# Patient Record
Sex: MALE | Race: WHITE | NOT HISPANIC OR LATINO | URBAN - METROPOLITAN AREA
[De-identification: names, ages, dates, MRNs, and addresses within clinical notes are randomized per-mention and may not be internally consistent; named-entity substitution may affect disease eponyms.]

---

## 2018-04-10 ENCOUNTER — EMERGENCY (EMERGENCY)
Facility: HOSPITAL | Age: 33
LOS: 0 days | Discharge: HOME | End: 2018-04-10
Attending: EMERGENCY MEDICINE

## 2018-04-10 VITALS
HEIGHT: 66 IN | SYSTOLIC BLOOD PRESSURE: 119 MMHG | RESPIRATION RATE: 18 BRPM | DIASTOLIC BLOOD PRESSURE: 80 MMHG | TEMPERATURE: 98 F | WEIGHT: 145.06 LBS | HEART RATE: 85 BPM | OXYGEN SATURATION: 100 %

## 2018-04-10 DIAGNOSIS — Z79.899 OTHER LONG TERM (CURRENT) DRUG THERAPY: ICD-10-CM

## 2018-04-10 DIAGNOSIS — F17.200 NICOTINE DEPENDENCE, UNSPECIFIED, UNCOMPLICATED: ICD-10-CM

## 2018-04-10 DIAGNOSIS — Z88.0 ALLERGY STATUS TO PENICILLIN: ICD-10-CM

## 2018-04-10 DIAGNOSIS — F11.10 OPIOID ABUSE, UNCOMPLICATED: ICD-10-CM

## 2018-04-10 NOTE — ED PROVIDER NOTE - OBJECTIVE STATEMENT
Patient is a 32 yo male with PM Hx of heroin abuse presents to the ER with requests for detox from heroin. Patient is a 32 yo male with PM Hx of heroin abuse presents to the ER with requests for detox from heroin. NO recent etoh use.  no suicidal ideation.  pt has no other complaints.  no cp, no sob, no abd pain, no back pain.  no headache.  Pt last used heroin today.  Friend found pt after using heroin.

## 2018-04-10 NOTE — ED PROVIDER NOTE - ATTENDING CONTRIBUTION TO CARE
34 yo m with pmh of polysubstance abuse presents requesting detox from heroin.  pt last used heroin today.  pt found by friend after using heroin.  no narcan was given.  No medications given.  pt brought in by friend for detox.  no recent etoh use.  pt requests detox from heroin only.  pt has no other complaints.  pt denies suicidal ideation.    awake, alert.  neck supple.  Lungs clear.  MMM.  pt moving all 4 ext.  motor/sensation intact in all 4 ext.  no tremors.  pt breathing comfortably.  a/p:  opiod abuse.  pt is nontoxic, well appearing.  pt did NOT get any narcan today.  pt dc with outpatient detox follow up.  pt is going now across the street to detox for opitod detox.

## 2018-04-10 NOTE — ED PROVIDER NOTE - PHYSICAL EXAMINATION
CONST: Well appearing in NAD  EYES: PERRL, EOMI, Sclera and conjunctiva clear  ENT: No nasal discharge. TM's clear B/L without drainage. Oropharynx normal appearing, no erythema or exudates. Uvula midline.  NECK: Non-tender, no meningeal signs  CARD: Normal S1 S2; Normal rate and rhythm  RESP: Equal BS B/L, No wheezes, rhonchi or rales. No distress  GI: Soft, non-tender, non-distended.  MS: Normal ROM in all extremities. No midline spinal tenderness.  SKIN: Warm, dry, no acute rashes. Good turgor  NEURO: A&Ox3, No focal deficits. Strength 5/5 with no sensory deficits. Steady gait

## 2018-04-10 NOTE — ED PROVIDER NOTE - PROGRESS NOTE DETAILS
Patient to be d/c'd and sent across the street for outpatient detox mgmt. Patient comfortable with d/c.

## 2018-04-10 NOTE — ED PROVIDER NOTE - MEDICAL DECISION MAKING DETAILS
Pt was here requesting detox from opiates.  pt dc with outpatient detox follow up.  pt has no other complaints.  pt was going to go directly to outpatient detox for detox from opiates.  pt denied any suicidal ideation.

## 2018-04-10 NOTE — ED PROVIDER NOTE - NS ED ROS FT
CONST: No fever  EYES: No pain, redness, drainage or visual changes.  ENT: No ear pain or discharge, nasal discharge or congestion. No sore throat  CARD: No chest pain, palpitations  RESP: No SOB, cough, hemoptysis  GI: No abdominal pain, N/V/D  MS: No joint pain, back pain or extremity pain/injury  SKIN: No rashes  NEURO: No headache, dizziness, paresthesias or LOC

## 2018-05-06 ENCOUNTER — INPATIENT (INPATIENT)
Facility: HOSPITAL | Age: 33
LOS: 4 days | Discharge: NURSING, REHAB OR LONG-TERM CARE | End: 2018-05-11
Attending: INTERNAL MEDICINE | Admitting: INTERNAL MEDICINE

## 2018-05-06 VITALS
RESPIRATION RATE: 16 BRPM | OXYGEN SATURATION: 100 % | TEMPERATURE: 98 F | HEART RATE: 72 BPM | HEIGHT: 67 IN | WEIGHT: 139.99 LBS | SYSTOLIC BLOOD PRESSURE: 122 MMHG | DIASTOLIC BLOOD PRESSURE: 62 MMHG

## 2018-05-06 LAB
ALBUMIN SERPL ELPH-MCNC: 4.1 G/DL — SIGNIFICANT CHANGE UP (ref 3.5–5.2)
ALP SERPL-CCNC: 127 U/L — HIGH (ref 30–115)
ALT FLD-CCNC: 21 U/L — SIGNIFICANT CHANGE UP (ref 0–41)
ANION GAP SERPL CALC-SCNC: 10 MMOL/L — SIGNIFICANT CHANGE UP (ref 7–14)
APAP SERPL-MCNC: <5 UG/ML — LOW (ref 10–30)
AST SERPL-CCNC: 52 U/L — HIGH (ref 0–41)
BASE EXCESS BLDV CALC-SCNC: 4.6 MMOL/L — HIGH (ref -2–2)
BILIRUB SERPL-MCNC: 0.5 MG/DL — SIGNIFICANT CHANGE UP (ref 0.2–1.2)
BUN SERPL-MCNC: 9 MG/DL — LOW (ref 10–20)
CALCIUM SERPL-MCNC: 8.5 MG/DL — SIGNIFICANT CHANGE UP (ref 8.5–10.1)
CHLORIDE SERPL-SCNC: 95 MMOL/L — LOW (ref 98–110)
CK SERPL-CCNC: 959 U/L — HIGH (ref 0–225)
CO2 SERPL-SCNC: 29 MMOL/L — SIGNIFICANT CHANGE UP (ref 17–32)
CREAT SERPL-MCNC: 1.2 MG/DL — SIGNIFICANT CHANGE UP (ref 0.7–1.5)
ETHANOL SERPL-MCNC: 355 MG/DL — HIGH
GLUCOSE SERPL-MCNC: 88 MG/DL — SIGNIFICANT CHANGE UP (ref 70–99)
HCO3 BLDV-SCNC: 32 MMOL/L — HIGH (ref 22–29)
HCT VFR BLD CALC: 45.2 % — SIGNIFICANT CHANGE UP (ref 42–52)
HGB BLD-MCNC: 14.9 G/DL — SIGNIFICANT CHANGE UP (ref 14–18)
HOROWITZ INDEX BLDV+IHG-RTO: 21 — SIGNIFICANT CHANGE UP
LACTATE BLDV-MCNC: 1.7 MMOL/L — HIGH (ref 0.5–1.6)
MCHC RBC-ENTMCNC: 27.9 PG — SIGNIFICANT CHANGE UP (ref 27–31)
MCHC RBC-ENTMCNC: 33 G/DL — SIGNIFICANT CHANGE UP (ref 32–37)
MCV RBC AUTO: 84.5 FL — SIGNIFICANT CHANGE UP (ref 80–94)
NRBC # BLD: 0 /100 WBCS — SIGNIFICANT CHANGE UP (ref 0–0)
PCO2 BLDV: 56 MMHG — HIGH (ref 41–51)
PH BLDV: 7.37 — SIGNIFICANT CHANGE UP (ref 7.26–7.43)
PLATELET # BLD AUTO: 265 K/UL — SIGNIFICANT CHANGE UP (ref 130–400)
PO2 BLDV: 31 MMHG — SIGNIFICANT CHANGE UP (ref 20–40)
POTASSIUM SERPL-MCNC: 5.3 MMOL/L — HIGH (ref 3.5–5)
POTASSIUM SERPL-SCNC: 5.3 MMOL/L — HIGH (ref 3.5–5)
PROT SERPL-MCNC: 7.2 G/DL — SIGNIFICANT CHANGE UP (ref 6–8)
RBC # BLD: 5.35 M/UL — SIGNIFICANT CHANGE UP (ref 4.7–6.1)
RBC # FLD: 13.9 % — SIGNIFICANT CHANGE UP (ref 11.5–14.5)
SALICYLATES SERPL-MCNC: <0.3 MG/DL — LOW (ref 4–30)
SAO2 % BLDV: 48 % — SIGNIFICANT CHANGE UP
SODIUM SERPL-SCNC: 134 MMOL/L — LOW (ref 135–146)
TROPONIN T SERPL-MCNC: <0.01 NG/ML — SIGNIFICANT CHANGE UP
WBC # BLD: 10.02 K/UL — SIGNIFICANT CHANGE UP (ref 4.8–10.8)
WBC # FLD AUTO: 10.02 K/UL — SIGNIFICANT CHANGE UP (ref 4.8–10.8)

## 2018-05-06 RX ORDER — NALOXONE HYDROCHLORIDE 4 MG/.1ML
0.4 SPRAY NASAL ONCE
Qty: 0 | Refills: 0 | Status: DISCONTINUED | OUTPATIENT
Start: 2018-05-06 | End: 2018-05-06

## 2018-05-06 RX ORDER — SODIUM CHLORIDE 9 MG/ML
1000 INJECTION INTRAMUSCULAR; INTRAVENOUS; SUBCUTANEOUS ONCE
Qty: 0 | Refills: 0 | Status: COMPLETED | OUTPATIENT
Start: 2018-05-06 | End: 2018-05-06

## 2018-05-06 RX ORDER — SODIUM BICARBONATE 1 MEQ/ML
50 SYRINGE (ML) INTRAVENOUS ONCE
Qty: 0 | Refills: 0 | Status: COMPLETED | OUTPATIENT
Start: 2018-05-06 | End: 2018-05-06

## 2018-05-06 RX ORDER — NALOXONE HYDROCHLORIDE 4 MG/.1ML
0.2 SPRAY NASAL ONCE
Qty: 0 | Refills: 0 | Status: COMPLETED | OUTPATIENT
Start: 2018-05-06 | End: 2018-05-06

## 2018-05-06 RX ORDER — MAGNESIUM SULFATE 500 MG/ML
2 VIAL (ML) INJECTION ONCE
Qty: 0 | Refills: 0 | Status: COMPLETED | OUTPATIENT
Start: 2018-05-06 | End: 2018-05-06

## 2018-05-06 RX ADMIN — Medication 50 MILLIEQUIVALENT(S): at 22:24

## 2018-05-06 RX ADMIN — Medication 50 GRAM(S): at 18:43

## 2018-05-06 RX ADMIN — SODIUM CHLORIDE 1000 MILLILITER(S): 9 INJECTION INTRAMUSCULAR; INTRAVENOUS; SUBCUTANEOUS at 22:04

## 2018-05-06 RX ADMIN — NALOXONE HYDROCHLORIDE 0.2 MILLIGRAM(S): 4 SPRAY NASAL at 17:50

## 2018-05-06 NOTE — ED PROVIDER NOTE - CARE PLAN
Principal Discharge DX:	Drug abuse Principal Discharge DX:	Alcohol abuse, continuous  Secondary Diagnosis:	Polysubstance abuse

## 2018-05-06 NOTE — ED ADULT TRIAGE NOTE - CHIEF COMPLAINT QUOTE
Pt brought in by brother as an overdose.  Per brother "He walked into my store like this.  He uses heroin, crack and any benzos he can get his hands on".

## 2018-05-06 NOTE — ED PROVIDER NOTE - PROGRESS NOTE DETAILS
VS remain stable, patient responded to 0.2mg on IV Narcan, pupils ar now nml size, he is more awake , follows directions.  Will keep 1:1 observation, case was d/c toxicology service. discussed with toxicology, recommends sodium bicarp amp to rule out sodium channel blockade.. will give amp and repeat EKG, if change in QRS will monitor , however, if no change, will be able to medically clear. amp of sodium barcab given, with no change in EKG Sleeping, VS WNL, will observe until clinically sober to obtain psych evaluation,  Case endorsed to Dr Arenas. VSS. sleeping. VSS. pt clinically sober. psych, dr. rivera at beside cleared from a psych perspective. recommend detox. pt declines detox and wishes to go home. counseled on risks and benefits. Counseled on red flags and to return for them. Counseled on importance of follow up. Patient repeats back instructions. Patient advised that they or their doctor may call [phone] to follow up on the specific results of the tests performed today in the emergency department.   Patient appears well on discharge. pt now wants detox. will admit for detox.

## 2018-05-06 NOTE — ED ADULT NURSE NOTE - PMH
Bipolar depression    Drug abuse    Drug abuse, IV    Methadone maintenance therapy patient Bipolar depression    Drug abuse    Drug abuse, IV    Endocarditis    Heart valve disease  tricuspid valve surgery  Methadone maintenance therapy patient

## 2018-05-06 NOTE — ED PROVIDER NOTE - MEDICAL DECISION MAKING DETAILS
I personally evaluated the patient. I reviewed the Resident’s or Physician Assistant’s note (as assigned above), and agree with the findings and plan except as documented in my note.  Chart reviewed. Seen by Dr. Roque (psych) and cleared for discharge. Dnies uicidal/homicidal ideation. Patient does not want detox. Will D/C home to follow up in outpatient detox. I personally evaluated the patient. I reviewed the Resident’s or Physician Assistant’s note (as assigned above), and agree with the findings and plan except as documented in my note.  Chart reviewed. Seen by Dr. Roque (psych) and cleared for discharge. Denies suicidal/homicidal ideation. Patient later agreed to detox. Will admit.

## 2018-05-06 NOTE — ED PROVIDER NOTE - PMH
Bipolar depression    Drug abuse    Drug abuse, IV    Endocarditis    Heart valve disease  tricuspid valve surgery  Methadone maintenance therapy patient

## 2018-05-06 NOTE — ED PROVIDER NOTE - PHYSICAL EXAMINATION
CONST: + pt drowsy, but arousable  EYES: +pinpoint pupuils, EOMI, Sclera and conjunctiva clear.   ENT: No nasal discharge. TM's clear B/L without drainage. Oropharynx normal appearing, no erythema or exudates. Uvula midline.  NECK: Non-tender, no meningeal signs  CARD: Normal S1 S2; Normal rate and rhythm  RESP: Equal BS B/L, No wheezes, rhonchi or rales. No distress  GI: Soft, non-tender, non-distended.  MS: Normal ROM in all extremities. No midline spinal tenderness.  SKIN: Warm, dry, no acute rashes. Good turgor  NEURO: A&Ox3, No focal deficits. Strength 5/5 with no sensory deficits.

## 2018-05-06 NOTE — ED PROVIDER NOTE - ATTENDING CONTRIBUTION TO CARE
32 yo male h/o bipolar disease and polysubstance abuse concurrently on Methadone program, lives in a shelter and unemployed brought by his brother for evaluation,  Brother is concerned that the patient is trying to kill himself, has been taking multiple drugs, drinking alcohol and saying  he wants to kill himself.  Has h/o prior detox, was clean for a bout a years, then relapsed and lost his job.  Patient arrived in ED intoxicated with signs of opioid overdose, clinical picture improved after administration of narcan,  Placed on 1:1 observation, labs were sent, will obtain psych consult when patient able to converse.  no external signs of trauma.

## 2018-05-06 NOTE — ED PROVIDER NOTE - OBJECTIVE STATEMENT
33 year old male with pmhx of ploysubstance use, brought in by brother for drug overdose. As per brother, unsure of what brother took, however using multiple drugs. brother admits pt walked into home and looked "high", so brought him in. as per family, pt has been having SI and talking about ways that he wants to hurt himself.

## 2018-05-07 DIAGNOSIS — F10.19 ALCOHOL ABUSE WITH UNSPECIFIED ALCOHOL-INDUCED DISORDER: ICD-10-CM

## 2018-05-07 DIAGNOSIS — F11.19 OPIOID ABUSE WITH UNSPECIFIED OPIOID-INDUCED DISORDER: ICD-10-CM

## 2018-05-07 DIAGNOSIS — F31.30 BIPOLAR DISORDER, CURRENT EPISODE DEPRESSED, MILD OR MODERATE SEVERITY, UNSPECIFIED: ICD-10-CM

## 2018-05-07 DIAGNOSIS — Z98.890 OTHER SPECIFIED POSTPROCEDURAL STATES: Chronic | ICD-10-CM

## 2018-05-07 DIAGNOSIS — F19.10 OTHER PSYCHOACTIVE SUBSTANCE ABUSE, UNCOMPLICATED: ICD-10-CM

## 2018-05-07 DIAGNOSIS — F11.129 OPIOID ABUSE WITH INTOXICATION, UNSPECIFIED: ICD-10-CM

## 2018-05-07 DIAGNOSIS — F11.20 OPIOID DEPENDENCE, UNCOMPLICATED: ICD-10-CM

## 2018-05-07 LAB
AMMONIA BLD-MCNC: 22 UMOL/L — SIGNIFICANT CHANGE UP (ref 11–55)
AMPHET UR-MCNC: NEGATIVE — SIGNIFICANT CHANGE UP
APPEARANCE UR: CLEAR — SIGNIFICANT CHANGE UP
BACTERIA # UR AUTO: (no result)
BARBITURATES UR SCN-MCNC: NEGATIVE — SIGNIFICANT CHANGE UP
BENZODIAZ UR-MCNC: POSITIVE
BILIRUB UR-MCNC: NEGATIVE — SIGNIFICANT CHANGE UP
COCAINE METAB.OTHER UR-MCNC: POSITIVE
COLOR SPEC: YELLOW — SIGNIFICANT CHANGE UP
DIFF PNL FLD: NEGATIVE — SIGNIFICANT CHANGE UP
DRUG SCREEN 1, URINE RESULT: SIGNIFICANT CHANGE UP
EPI CELLS # UR: (no result) /HPF
GLUCOSE UR QL: NEGATIVE MG/DL — SIGNIFICANT CHANGE UP
HAV IGM SER-ACNC: SIGNIFICANT CHANGE UP
HBV CORE IGM SER-ACNC: SIGNIFICANT CHANGE UP
HBV SURFACE AG SER-ACNC: SIGNIFICANT CHANGE UP
HCV AB S/CO SERPL IA: 14.38 S/CO — SIGNIFICANT CHANGE UP
HCV AB SERPL-IMP: REACTIVE
KETONES UR-MCNC: NEGATIVE — SIGNIFICANT CHANGE UP
LEUKOCYTE ESTERASE UR-ACNC: NEGATIVE — SIGNIFICANT CHANGE UP
MAGNESIUM SERPL-MCNC: 2.4 MG/DL — SIGNIFICANT CHANGE UP (ref 1.8–2.4)
METHADONE UR-MCNC: POSITIVE
NITRITE UR-MCNC: NEGATIVE — SIGNIFICANT CHANGE UP
OPIATES UR-MCNC: POSITIVE
PCP UR-MCNC: NEGATIVE — SIGNIFICANT CHANGE UP
PH UR: 7.5 — SIGNIFICANT CHANGE UP (ref 5–8)
PROPOXYPHENE QUALITATIVE URINE RESULT: NEGATIVE — SIGNIFICANT CHANGE UP
PROT UR-MCNC: (no result) MG/DL
SP GR SPEC: 1.02 — SIGNIFICANT CHANGE UP (ref 1.01–1.03)
T PALLIDUM AB TITR SER: NEGATIVE — SIGNIFICANT CHANGE UP
THC UR QL: NEGATIVE — SIGNIFICANT CHANGE UP
UROBILINOGEN FLD QL: 0.2 MG/DL — SIGNIFICANT CHANGE UP (ref 0.2–0.2)

## 2018-05-07 RX ORDER — MAGNESIUM HYDROXIDE 400 MG/1
30 TABLET, CHEWABLE ORAL DAILY
Qty: 0 | Refills: 0 | Status: DISCONTINUED | OUTPATIENT
Start: 2018-05-07 | End: 2018-05-11

## 2018-05-07 RX ORDER — HYDROXYZINE HCL 10 MG
100 TABLET ORAL AT BEDTIME
Qty: 0 | Refills: 0 | Status: DISCONTINUED | OUTPATIENT
Start: 2018-05-07 | End: 2018-05-11

## 2018-05-07 RX ORDER — PHENOBARBITAL 60 MG
32.4 TABLET ORAL EVERY 12 HOURS
Qty: 0 | Refills: 0 | Status: DISCONTINUED | OUTPATIENT
Start: 2018-05-10 | End: 2018-05-11

## 2018-05-07 RX ORDER — THIAMINE MONONITRATE (VIT B1) 100 MG
100 TABLET ORAL DAILY
Qty: 0 | Refills: 0 | Status: DISCONTINUED | OUTPATIENT
Start: 2018-05-07 | End: 2018-05-11

## 2018-05-07 RX ORDER — HYDROXYZINE HCL 10 MG
50 TABLET ORAL EVERY 6 HOURS
Qty: 0 | Refills: 0 | Status: DISCONTINUED | OUTPATIENT
Start: 2018-05-07 | End: 2018-05-11

## 2018-05-07 RX ORDER — PHENOBARBITAL 60 MG
32.4 TABLET ORAL EVERY 6 HOURS
Qty: 0 | Refills: 0 | Status: DISCONTINUED | OUTPATIENT
Start: 2018-05-09 | End: 2018-05-09

## 2018-05-07 RX ORDER — PHENOBARBITAL 60 MG
32.4 TABLET ORAL EVERY 4 HOURS
Qty: 0 | Refills: 0 | Status: DISCONTINUED | OUTPATIENT
Start: 2018-05-07 | End: 2018-05-11

## 2018-05-07 RX ORDER — PHENOBARBITAL 60 MG
32.4 TABLET ORAL EVERY 6 HOURS
Qty: 0 | Refills: 0 | Status: DISCONTINUED | OUTPATIENT
Start: 2018-05-07 | End: 2018-05-09

## 2018-05-07 RX ORDER — METHADONE HYDROCHLORIDE 40 MG/1
90 TABLET ORAL DAILY
Qty: 0 | Refills: 0 | Status: DISCONTINUED | OUTPATIENT
Start: 2018-05-07 | End: 2018-05-07

## 2018-05-07 RX ORDER — METHADONE HYDROCHLORIDE 40 MG/1
90 TABLET ORAL DAILY
Qty: 0 | Refills: 0 | Status: DISCONTINUED | OUTPATIENT
Start: 2018-05-07 | End: 2018-05-11

## 2018-05-07 RX ORDER — ESCITALOPRAM OXALATE 10 MG/1
20 TABLET, FILM COATED ORAL DAILY
Qty: 0 | Refills: 0 | Status: DISCONTINUED | OUTPATIENT
Start: 2018-05-07 | End: 2018-05-11

## 2018-05-07 RX ORDER — TUBERCULIN PURIFIED PROTEIN DERIVATIVE 5 [IU]/.1ML
5 INJECTION, SOLUTION INTRADERMAL ONCE
Qty: 0 | Refills: 0 | Status: COMPLETED | OUTPATIENT
Start: 2018-05-07 | End: 2018-05-07

## 2018-05-07 RX ORDER — FOLIC ACID 0.8 MG
1 TABLET ORAL DAILY
Qty: 0 | Refills: 0 | Status: DISCONTINUED | OUTPATIENT
Start: 2018-05-07 | End: 2018-05-11

## 2018-05-07 RX ORDER — PHENOBARBITAL 60 MG
TABLET ORAL
Qty: 0 | Refills: 0 | Status: COMPLETED | OUTPATIENT
Start: 2018-05-07 | End: 2018-05-11

## 2018-05-07 RX ADMIN — TUBERCULIN PURIFIED PROTEIN DERIVATIVE 5 UNIT(S): 5 INJECTION, SOLUTION INTRADERMAL at 11:22

## 2018-05-07 RX ADMIN — ESCITALOPRAM OXALATE 20 MILLIGRAM(S): 10 TABLET, FILM COATED ORAL at 09:14

## 2018-05-07 RX ADMIN — Medication 32.4 MILLIGRAM(S): at 17:38

## 2018-05-07 RX ADMIN — Medication 100 MILLIGRAM(S): at 09:13

## 2018-05-07 RX ADMIN — Medication 1 MILLIGRAM(S): at 09:13

## 2018-05-07 RX ADMIN — Medication 32.4 MILLIGRAM(S): at 12:44

## 2018-05-07 RX ADMIN — METHADONE HYDROCHLORIDE 90 MILLIGRAM(S): 40 TABLET ORAL at 09:51

## 2018-05-07 RX ADMIN — Medication 1 TABLET(S): at 09:13

## 2018-05-07 NOTE — ED BEHAVIORAL HEALTH ASSESSMENT NOTE - REASON FOR REFERRAL
Pt brought brother to ER due drug intoxication verbalizing suicidal ideation. Pt was brought by brother to ER due drug intoxication verbalizing suicidal ideation.

## 2018-05-07 NOTE — ED ADULT NURSE REASSESSMENT NOTE - NS ED NURSE REASSESS COMMENT FT1
Pt asleep at this time responsive to stimuli; A&Ox3. 1:1 observation in place. Father also at bedside.

## 2018-05-07 NOTE — ED BEHAVIORAL HEALTH ASSESSMENT NOTE - DETAILS
none available pt has 2 siblings with history of substance use. Case and psychiatric disposition discussed with ED MD Dr. Arenas

## 2018-05-07 NOTE — ED BEHAVIORAL HEALTH ASSESSMENT NOTE - HPI (INCLUDE ILLNESS QUALITY, SEVERITY, DURATION, TIMING, CONTEXT, MODIFYING FACTORS, ASSOCIATED SIGNS AND SYMPTOMS)
Pt is 32 y/o White male with long history of polysubstance use since 18 y/o consisting of alcohol, opioid and benzodiazepines with multiple detox and long term rehab at least more than three times the last time was 3 years ago. He currently has been attending [facility] in [place], [place] and it's Methadone program.  Pt was brought in by brother for possible drug overdose. Brother reported that possibly has using multiple drugs such as alcohol and heroin. Pt apparently left home and he came back, he looked "high."  Pt was reported to have verbalized thoughts of wanting to die before he left home so he was brought by brother  to ER and was subsequently referred for psychiatric consultation.

## 2018-05-07 NOTE — ED BEHAVIORAL HEALTH ASSESSMENT NOTE - DESCRIPTION
Pt was seen and evaluated by ER MD. He treated with Narcan for opioid intoxication. He was medically cleared and when he became sober, he referred for psychiatric consultation.  Pt was seen lying in bed, awake and sober.  Pt states that he does not remember saying the wanted to die and admits to have been drinking beer and vodka as well as used iv heroin prior to being brought to the ER.  Pt states that he does not remember who brought him to ER.  Pt currently alert, awake, oriented to all spheres.  He denies any severe depression, hopelessness or helplessness.  He reported that the psychiatrist at the [facility] has been prescribing him Remeron to help him sleep.  He denies any suicidal ideation, plan or intent to die at present.  He denies any psychiatric disorder, treatment or psychiatric hospitalization and denies any history of suicidal or homicidal risk behavior in the past and at present.  Pt does not pose any substantial risk of harm to self or others at this time and is psychiatrically cleared for discharge.  Pt was recommended to go for detox and rehab treatment. Pt denies any medical problem. pt is single never  finished High School currently unemployed and staying in a men's shelter.

## 2018-05-07 NOTE — H&P ADULT - ATTENDING COMMENTS
Patient interviewed and examined.    Chart reviewed.    PA's H&P noted and modified, as appropriate.    Case discussed on team rounds    Following is my summary of the case.    Admitted for detox: from __X__ED, ___Intake, ____Med/Surg Floor    Alcohol__X__   Opioid__X___  Benzo_X__ Other_____    Substance amount, duration of use, last usage, and prior attempts at detox or rehabs, are outlined above in the H&P and discussed with patient.    Associated withdrawal symptoms presents.  Comorbid conditions noted. Chronic and Stable.    Past Medical Hx, Psych Hx, family Hx, Social Hx from H&P reviewed and NO changes.    Old medical record and medication Hx. Reviewed    Following items reviewed and addressed:  1. labs  2. EKG  3. Imaging from PACs module    Examination: no change from PA's exam.    Place on following protocol  _____Medically Managed  __X__Medically Supervised    Ciwa_____Librium taper____Ativan taper___Methadone taper___ Phenobarb taper__X__ Suboxone Induction____MMTP_X___    Narcan Kit Offered    Psych Consult ___N/A  _X__Ordered  MDD    Physical Therapy  ___X N/A   ___  Ordered    Aftercare disposition to be addressed by counselors.    Estimated length of stay 3-5 days.

## 2018-05-07 NOTE — H&P ADULT - HISTORY OF PRESENT ILLNESS
· HPI Objective Statement: 33 year old male with pmhx of ploysubstance use, brought in by brother for drug overdose. As per brother, unsure of what brother took, however using multiple drugs. brother admits pt walked into home and looked "high", so brought him in. as per family, pt has been having SI and talking about ways that he wants to hurt himself., now awake alert  requesting detox  from alcohol vodka  1 pint daily  x  several weeks, xanax 4 mg  daily, , on mmtp [facility]  program 90 mgms  daily , last medicated   5/6/18  90 mgms	    HIV:    HIV Status:  · Offered: Declined	    PAST MEDICAL/SURGICAL/FAMILY/SOCIAL HISTORY:    Past Medical History:  Bipolar depression    Drug abuse    Drug abuse, IV    Endocarditis    Heart valve disease  tricuspid valve surgery  Methadone maintenance therapy patient.     Past Surgical History:  No significant past surgical history.     Tobacco Usage:  · Tobacco Usage	Unknown if ever smoked	    ALLERGIES AND HOME MEDICATIONS:   Allergies:        Allergies:  	penicillin: Drug, Other, unk    Home Medications:   * Incomplete Medication History as of 06-May-2018 18:22 documented in Structured Notes  · 	methadone: Last Dose Taken:  , 90 milligram(s) orally  · 	Lexapro: Last Dose Taken:  , orally once a day      PHYSICAL EXAM:   · Physical Examination: CONST: + pt drowsy, but arousable  EYES: +pinpoint pupuils, EOMI, Sclera and conjunctiva clear.   ENT: No nasal discharge. TM's clear B/L without drainage. Oropharynx normal appearing, no erythema or exudates. Uvula midline.  NECK: Non-tender, no meningeal signs  CARD: Normal S1 S2; Normal rate and rhythm  RESP: Equal BS B/L, No wheezes, rhonchi or rales. No distress  GI: Soft, non-tender, non-distended.  MS: Normal ROM in all extremities. No midline spinal tenderness.  SKIN: Warm, dry, no acute rashes. Good turgor NEURO: A&Ox3, No focal deficits. Strength 5/5 with no sensory deficits.

## 2018-05-07 NOTE — ED BEHAVIORAL HEALTH ASSESSMENT NOTE - OTHER
Brother and father brought pt to ER other shelter residents Pt used Iv heroin and alcohol opioid and alcohol intoxication Return to ER PRN

## 2018-05-07 NOTE — ED BEHAVIORAL HEALTH ASSESSMENT NOTE - DESCRIPTION (FIRST USE, LAST USE, QUANTITY, FREQUENCY, DURATION)
pt as been drinking on and off for the past 3 years a couple beers and vodka. pt admits to have been using IV heroin 4 to 5 bags per day for at least the past 3 years pt admits to occasional use of Xanax obtain from the streets

## 2018-05-07 NOTE — ED ADULT NURSE REASSESSMENT NOTE - NS ED NURSE REASSESS COMMENT FT1
Report given to [place] Mitchell, Detox. Pt AAOx3; suicide assessment completed; pt denies any depression, suicide, homicidal ideations. IV removed from LEFT A/C. Family ar bedside. Pt stable for transfer; awaiting transportation.

## 2018-05-07 NOTE — ED BEHAVIORAL HEALTH ASSESSMENT NOTE - OTHER PAST PSYCHIATRIC HISTORY (INCLUDE DETAILS REGARDING ONSET, COURSE OF ILLNESS, INPATIENT/OUTPATIENT TREATMENT)
Pt denies any past psychiatric disorder, treatment or hospitalization and admits to have been prescribed by psychiatrist  at [facility] with Remeron to help him fall asleep.

## 2018-05-07 NOTE — ED ADULT NURSE REASSESSMENT NOTE - NS ED NURSE REASSESS COMMENT FT1
Pt AAOx3; calm/cooperative; communicating w/ writer. Denies pain/discomfort. Pt no longer 1:1 as per MD Dorman.

## 2018-05-07 NOTE — H&P ADULT - NSHPLABSRESULTS_GEN_ALL_CORE
14.9   10.02 )-----------( 265      ( 06 May 2018 17:56 )             45.2   05-06    134<L>  |  95<L>  |  9<L>  ----------------------------<  88  5.3<H>   |  29  |  1.2    Ca    8.5      06 May 2018 17:56  Mg     2.4     05-07    TPro  7.2  /  Alb  4.1  /  TBili  0.5  /  DBili  x   /  AST  52<H>  /  ALT  21  /  AlkPhos  127<H>  05-06

## 2018-05-07 NOTE — ED BEHAVIORAL HEALTH ASSESSMENT NOTE - SUMMARY
Pt is a 32 y/o White male who presents to the ER with opioid and alcohol intoxication and was referred for psychiatric consultation due to reported verbalization of wanting to die.  History revealed that patient started using alcohol at age 18 y/o and progressed to IV heroin.  He had multiple detox and long term rehab at least 3 times, the last one was 3 years ago but relapsed and has been using alcohol , heroin and benzodiazepines within the past 3 years currently attending Methadone clinic. Pt has maladaptive use of substances for more than 12 months that has impaired his functioning with failure to perform his family  obligation and  responsibilities.  He is currently unemployed, unable to hold jobs due to his substance use and unable to stop using substances and spends most of his seeking and using substances.  Pt meets the criteria for diagnosis of Opioid Use Disorder, Alcohol Use Disorder and Anxiolytic, Hypnotic, Sedative Use Disorder. Pt does not pose any substantial risk of harm to self or others at this time and is psychiatrically cleared for discharge.  Pt was recommended to go for detox and rehab treatment.

## 2018-05-08 LAB
HCV RNA FLD QL NAA+PROBE: SIGNIFICANT CHANGE UP
HCV RNA SPEC QL PROBE+SIG AMP: SIGNIFICANT CHANGE UP

## 2018-05-08 RX ORDER — METHADONE HYDROCHLORIDE 40 MG/1
90 TABLET ORAL ONCE
Qty: 0 | Refills: 0 | Status: DISCONTINUED | OUTPATIENT
Start: 2018-05-08 | End: 2018-05-08

## 2018-05-08 RX ORDER — SALICYLIC ACID 0.5 %
1 CLEANSER (GRAM) TOPICAL
Qty: 0 | Refills: 0 | Status: DISCONTINUED | OUTPATIENT
Start: 2018-05-08 | End: 2018-05-11

## 2018-05-08 RX ADMIN — Medication 1 MILLIGRAM(S): at 09:27

## 2018-05-08 RX ADMIN — Medication 100 MILLIGRAM(S): at 09:27

## 2018-05-08 RX ADMIN — Medication 32.4 MILLIGRAM(S): at 11:56

## 2018-05-08 RX ADMIN — Medication 32.4 MILLIGRAM(S): at 00:29

## 2018-05-08 RX ADMIN — Medication 32.4 MILLIGRAM(S): at 17:30

## 2018-05-08 RX ADMIN — ESCITALOPRAM OXALATE 20 MILLIGRAM(S): 10 TABLET, FILM COATED ORAL at 09:27

## 2018-05-08 RX ADMIN — Medication 32.4 MILLIGRAM(S): at 06:01

## 2018-05-08 RX ADMIN — Medication 1 APPLICATION(S): at 09:27

## 2018-05-08 RX ADMIN — Medication 100 MILLIGRAM(S): at 00:29

## 2018-05-08 RX ADMIN — Medication 1 TABLET(S): at 09:27

## 2018-05-08 RX ADMIN — METHADONE HYDROCHLORIDE 90 MILLIGRAM(S): 40 TABLET ORAL at 09:28

## 2018-05-09 RX ORDER — DOCUSATE SODIUM 100 MG
200 CAPSULE ORAL
Qty: 0 | Refills: 0 | Status: DISCONTINUED | OUTPATIENT
Start: 2018-05-09 | End: 2018-05-11

## 2018-05-09 RX ADMIN — Medication 200 MILLIGRAM(S): at 20:28

## 2018-05-09 RX ADMIN — Medication 32.4 MILLIGRAM(S): at 06:10

## 2018-05-09 RX ADMIN — Medication 1 TABLET(S): at 08:45

## 2018-05-09 RX ADMIN — Medication 1 MILLIGRAM(S): at 08:45

## 2018-05-09 RX ADMIN — Medication 30 MILLILITER(S): at 13:57

## 2018-05-09 RX ADMIN — Medication 1 APPLICATION(S): at 08:45

## 2018-05-09 RX ADMIN — METHADONE HYDROCHLORIDE 90 MILLIGRAM(S): 40 TABLET ORAL at 06:10

## 2018-05-09 RX ADMIN — Medication 32.4 MILLIGRAM(S): at 17:20

## 2018-05-09 RX ADMIN — TUBERCULIN PURIFIED PROTEIN DERIVATIVE 5 UNIT(S): 5 INJECTION, SOLUTION INTRADERMAL at 14:42

## 2018-05-09 RX ADMIN — Medication 32.4 MILLIGRAM(S): at 00:48

## 2018-05-09 RX ADMIN — ESCITALOPRAM OXALATE 20 MILLIGRAM(S): 10 TABLET, FILM COATED ORAL at 08:45

## 2018-05-09 RX ADMIN — Medication 100 MILLIGRAM(S): at 08:45

## 2018-05-09 RX ADMIN — Medication 32.4 MILLIGRAM(S): at 11:18

## 2018-05-09 NOTE — BEHAVIORAL HEALTH ASSESSMENT NOTE - SUMMARY
34 yo SWM w ho polysubstance use, diagnosis of depression made while using substances. Has never had full trial of SSRI due to GI upset. Pt is tolerating meds this time around and agrees to remain compliant. Is motivated for recovery but ambivalent about inpt treatment.   Continue with Lexapro 20 mg.

## 2018-05-09 NOTE — BEHAVIORAL HEALTH ASSESSMENT NOTE - HPI (INCLUDE ILLNESS QUALITY, SEVERITY, DURATION, TIMING, CONTEXT, MODIFYING FACTORS, ASSOCIATED SIGNS AND SYMPTOMS)
34 yo SWM w ho sed-hyp use disorder, etoh, opiate use disorder on mmtp. Pt reports use of etoh starting age 15 and age 22 use of "pills" oxycontin, benzos. Pt reports first intervention age 24 or 25- did detox and 30 day rehab with an outpt program as follow up and maintained sobriety for 1 yr after that. Pt reports relapse attributed to "I still wanted to dabble". Pt reports approx 4 times in rehab/detox setting with longest abstinence the year mentioned above. Pt reports motivation for recovery attributed to "my family". Pt reports coming in to ER 2 days ago during which it was reported he may have had several seizures but pt has poor recollection of events of that day.   Pt has been diagnosed with MDD and anxiety in late 20s, denies past IPP, denies past suicide attempts, denies current SI. Pt reports past med trials of lexapro but has never been consistent as he reported the first few doses "made me queasy". Pt reports currently he is feeling optimistic about recovery but ambivalent about being hospitalized for it. During assessment, affect is neutral. Has been helped by methadone to reduce opiate use but pt reports recent binge drinking and increased benzo use.

## 2018-05-09 NOTE — BEHAVIORAL HEALTH ASSESSMENT NOTE - DETAILS
last seizure 2 days ago, believes this wasn't his first sz paternal uncle "bipolar schizophrenic" paternal grandfather w etoh use disorder

## 2018-05-10 RX ORDER — METHADONE HYDROCHLORIDE 40 MG/1
90 TABLET ORAL
Qty: 0 | Refills: 0 | COMMUNITY

## 2018-05-10 RX ADMIN — ESCITALOPRAM OXALATE 20 MILLIGRAM(S): 10 TABLET, FILM COATED ORAL at 08:52

## 2018-05-10 RX ADMIN — Medication 32.4 MILLIGRAM(S): at 06:11

## 2018-05-10 RX ADMIN — Medication 200 MILLIGRAM(S): at 08:52

## 2018-05-10 RX ADMIN — Medication 100 MILLIGRAM(S): at 00:33

## 2018-05-10 RX ADMIN — Medication 32.4 MILLIGRAM(S): at 17:36

## 2018-05-10 RX ADMIN — Medication 1 MILLIGRAM(S): at 08:52

## 2018-05-10 RX ADMIN — Medication 100 MILLIGRAM(S): at 08:52

## 2018-05-10 RX ADMIN — Medication 1 APPLICATION(S): at 08:52

## 2018-05-10 RX ADMIN — Medication 1 TABLET(S): at 08:52

## 2018-05-10 RX ADMIN — METHADONE HYDROCHLORIDE 90 MILLIGRAM(S): 40 TABLET ORAL at 06:11

## 2018-05-10 RX ADMIN — Medication 200 MILLIGRAM(S): at 21:07

## 2018-05-10 NOTE — CHART NOTE - NSCHARTNOTEFT_GEN_A_CORE
Subsequent Inpatient Encounter                                       Detox Unit    ARTHUR CASTILLO   33y   Male      Chief Complaint:    Follow up for Polysubstance  Dependency    HPI:     I reviewed previous notes. No Change, except if noted below.             Detail:_    ROS:   I reviewed with patient.  No changes from previous notes except if noted below.             Detail: _    PFSH I reviewed with patient. No changes from previous notes except if noted below.             Detail_    Medication reconciliation performed.    MEDICATIONS  (STANDING):  escitalopram 20 milliGRAM(s) Oral daily  folic acid 1 milliGRAM(s) Oral daily  methadone   Dispersible 90 milliGRAM(s) Oral daily  multivitamin 1 Tablet(s) Oral daily  PHENobarbital   Oral   PHENobarbital 32.4 milliGRAM(s) Oral every 6 hours  thiamine 100 milliGRAM(s) Oral daily      MEDICATIONS  (PRN):  aluminum hydroxide/magnesium hydroxide/simethicone Suspension 30 milliLiter(s) Oral every 4 hours PRN Dyspepsia  cloNIDine 0.1 milliGRAM(s) Oral every 8 hours PRN sbp>140  hydrOXYzine hydrochloride 50 milliGRAM(s) Oral every 6 hours PRN Anxiety  hydrOXYzine hydrochloride 100 milliGRAM(s) Oral at bedtime PRN insomnia  magnesium hydroxide Suspension 30 milliLiter(s) Oral daily PRN Constipation  PHENobarbital 32.4 milliGRAM(s) Oral every 4 hours PRN Withdrawal  vitamin A &amp; D Ointment 1 Application(s) Topical four times a day PRN dry skin      T(C): 35.7 (18 @ 06:00), Max: 36.9 (18 @ 18:00)  HR: 60 (18 @ 06:00) (53 - 65)  BP: 139/55 (18 @ 06:00) (118/69 - 139/55)  RR: 14 (18 @ 06:00) (14 - 16)  SpO2: --    PHYSICAL EXAM:      Constitutional: NAD, A&O x3    Eyes: PERRLA, no conjuctivitis    Neck: no lymphadenopathy    Respiratory: +air entry, no rales, no rhonchi, no wheezes    Cardiovascular: +S1 and S2, regular rate and rhythm    Gastrointestinal: +BS, soft, non-tender, not distended    Extremities:  no edema, no calf tenderness    Skin: no rashes, normal turgor            Magnesium, Serum: 2.4 mg/dL (18 @ 05:07)  Ammonia, Serum: 22 umol/L (18 @ 05:07)  Treponema Pallidum Antibody Interpretation: Negative (18 @ 05:07)  Hepatitis B Surface Antigen: Nonreact (18 @ 05:07)  Hepatitis C Virus S/CO Ratio: 14.38 S/CO (18 @ 05:07)    Hepatitis C Virus Interpretation: Reactive (18 @ 05:07)      Urinalysis Basic - ( 07 May 2018 10:55 )    Color: Yellow / Appearance: Clear / S.020 / pH: x  Gluc: x / Ketone: Negative  / Bili: Negative / Urobili: 0.2 mg/dL   Blood: x / Protein: Trace mg/dL / Nitrite: Negative   Leuk Esterase: Negative / RBC: x / WBC x   Sq Epi: x / Non Sq Epi: Few /HPF / Bacteria: Moderate    Drug Screen 1, Urine Result: Done (18 @ 10:55)        Impression and Plan:    Primary Diagnosis:  Benzo/Opiate Dependency                                Medication: Pheno/Methadone Protocol    Secondary Diagnosis:                                                                                Medication:    Tertiary Diagnosis:                                                                                     Medication:      Continue Detox Protocols. Use of PRNS as needed for withdrawal and comfort.    Adjustments to protocols:    Labs/ Tests reviewed.    Tests ordered:     Likely Disposition: _X__Home       ___Rehab       ___Outpatient Program    ___Self Help     _____Other    Estimated Length of stay:__4__

## 2018-05-10 NOTE — CHART NOTE - NSCHARTNOTEFT_GEN_A_CORE
Allergies:  penicillin (Other)      Diet: Regular    Activity: as tolerated    Follow up with    1. PMD in 2 weeks    2. Psych in 2 weeks      Extra Instructions:      Flu Vaccine given  Yes_____         No______      Diagnosis:  Chemical Dependency   Maintain sobriety  refrain from all use      Patient Signature___________________________________________  Date_________________      Nurse Signature_____________________________________________Date_________________

## 2018-05-11 ENCOUNTER — INPATIENT (INPATIENT)
Facility: HOSPITAL | Age: 33
LOS: 11 days | Discharge: HOME | End: 2018-05-23
Attending: INTERNAL MEDICINE | Admitting: INTERNAL MEDICINE

## 2018-05-11 VITALS
RESPIRATION RATE: 16 BRPM | TEMPERATURE: 98 F | HEART RATE: 92 BPM | DIASTOLIC BLOOD PRESSURE: 83 MMHG | SYSTOLIC BLOOD PRESSURE: 125 MMHG

## 2018-05-11 VITALS
RESPIRATION RATE: 16 BRPM | WEIGHT: 139.99 LBS | HEART RATE: 68 BPM | TEMPERATURE: 98 F | DIASTOLIC BLOOD PRESSURE: 59 MMHG | SYSTOLIC BLOOD PRESSURE: 116 MMHG | HEIGHT: 66 IN

## 2018-05-11 DIAGNOSIS — F10.20 ALCOHOL DEPENDENCE, UNCOMPLICATED: ICD-10-CM

## 2018-05-11 DIAGNOSIS — Z98.890 OTHER SPECIFIED POSTPROCEDURAL STATES: Chronic | ICD-10-CM

## 2018-05-11 PROBLEM — I38 ENDOCARDITIS, VALVE UNSPECIFIED: Chronic | Status: ACTIVE | Noted: 2018-05-06

## 2018-05-11 PROBLEM — F31.30 BIPOLAR DISORDER, CURRENT EPISODE DEPRESSED, MILD OR MODERATE SEVERITY, UNSPECIFIED: Chronic | Status: ACTIVE | Noted: 2018-05-06

## 2018-05-11 RX ORDER — HYDROXYZINE HCL 10 MG
50 TABLET ORAL EVERY 6 HOURS
Qty: 0 | Refills: 0 | Status: DISCONTINUED | OUTPATIENT
Start: 2018-05-11 | End: 2018-05-23

## 2018-05-11 RX ORDER — METHADONE HYDROCHLORIDE 40 MG/1
90 TABLET ORAL
Qty: 0 | Refills: 0 | Status: DISCONTINUED | OUTPATIENT
Start: 2018-05-11 | End: 2018-05-18

## 2018-05-11 RX ORDER — HYDROXYZINE HCL 10 MG
100 TABLET ORAL AT BEDTIME
Qty: 0 | Refills: 0 | Status: DISCONTINUED | OUTPATIENT
Start: 2018-05-11 | End: 2018-05-23

## 2018-05-11 RX ORDER — ESCITALOPRAM OXALATE 10 MG/1
20 TABLET, FILM COATED ORAL DAILY
Qty: 0 | Refills: 0 | Status: DISCONTINUED | OUTPATIENT
Start: 2018-05-11 | End: 2018-05-23

## 2018-05-11 RX ORDER — ACETAMINOPHEN 500 MG
650 TABLET ORAL EVERY 8 HOURS
Qty: 0 | Refills: 0 | Status: DISCONTINUED | OUTPATIENT
Start: 2018-05-11 | End: 2018-05-23

## 2018-05-11 RX ORDER — METHADONE HYDROCHLORIDE 40 MG/1
90 TABLET ORAL DAILY
Qty: 0 | Refills: 0 | Status: DISCONTINUED | OUTPATIENT
Start: 2018-05-11 | End: 2018-05-11

## 2018-05-11 RX ADMIN — Medication 200 MILLIGRAM(S): at 08:24

## 2018-05-11 RX ADMIN — Medication 100 MILLIGRAM(S): at 00:51

## 2018-05-11 RX ADMIN — Medication 100 MILLIGRAM(S): at 21:09

## 2018-05-11 RX ADMIN — Medication 1 TABLET(S): at 08:25

## 2018-05-11 RX ADMIN — Medication 1 MILLIGRAM(S): at 08:25

## 2018-05-11 RX ADMIN — ESCITALOPRAM OXALATE 20 MILLIGRAM(S): 10 TABLET, FILM COATED ORAL at 08:24

## 2018-05-11 RX ADMIN — Medication 100 MILLIGRAM(S): at 08:25

## 2018-05-11 RX ADMIN — Medication 32.4 MILLIGRAM(S): at 06:04

## 2018-05-11 RX ADMIN — METHADONE HYDROCHLORIDE 90 MILLIGRAM(S): 40 TABLET ORAL at 06:05

## 2018-05-11 NOTE — CHART NOTE - NSCHARTNOTEFT_GEN_A_CORE
The patient was admitted to the inpt detox unit CDU, for   ETOH____ Opioid__x_  Benzo_x___Polysubstance _____ Dependency.    Pt was admitted from ED____, Intake_x___, Med/surg Floor_______.    Details are present in the preceding History & Physical section and follow up chart notes.  patient was evaluated on daily detox team  rounds.  Withdrawal symptoms and signs were reviewed on a daily basis, and the protocols were adjusted accordingly.    Labs and imaging results were reviewed and discussed with the patient.    All questions from the patient were addressed.  The patient was seen by the Chemical dependency counselors, and different options for after care were discussed.  The patient attended groups, meetings and 1:1 sessions with the counselors.  Narcane Kit was offered and instructions given prior to discharge.    Psychiatry consultation reviewed______, N/A__x____    Physical therapy evaluation reviewed_____, N/A__x__    Pt was given copies of labs and imaging reports, if applicable.    Prescriptions if needed, were sent through [facility] system to the pharmacy , are noted in the discharge instruction sheet.    After care was arranged by counselors and pt was discharged to:    Home___, Outpt. Program___, Rehab _x__, Long term____ [facility] ____ IPP____ SNF____, AMA___, Admin Discharge____    Principal Diagnosis: Alcohol Dependency____ Opioid Dependency__x_ Benzo Dependency_x___ Polysubstance Dependency____

## 2018-05-12 RX ORDER — DOCUSATE SODIUM 100 MG
100 CAPSULE ORAL
Qty: 0 | Refills: 0 | Status: DISCONTINUED | OUTPATIENT
Start: 2018-05-12 | End: 2018-05-23

## 2018-05-12 RX ADMIN — METHADONE HYDROCHLORIDE 90 MILLIGRAM(S): 40 TABLET ORAL at 05:50

## 2018-05-12 RX ADMIN — Medication 100 MILLIGRAM(S): at 21:20

## 2018-05-12 RX ADMIN — ESCITALOPRAM OXALATE 20 MILLIGRAM(S): 10 TABLET, FILM COATED ORAL at 09:00

## 2018-05-13 RX ADMIN — METHADONE HYDROCHLORIDE 90 MILLIGRAM(S): 40 TABLET ORAL at 05:55

## 2018-05-13 RX ADMIN — Medication 100 MILLIGRAM(S): at 00:47

## 2018-05-13 RX ADMIN — ESCITALOPRAM OXALATE 20 MILLIGRAM(S): 10 TABLET, FILM COATED ORAL at 08:06

## 2018-05-13 RX ADMIN — Medication 100 MILLIGRAM(S): at 23:19

## 2018-05-13 RX ADMIN — Medication 100 MILLIGRAM(S): at 08:06

## 2018-05-14 RX ORDER — MIRTAZAPINE 45 MG/1
15 TABLET, ORALLY DISINTEGRATING ORAL AT BEDTIME
Qty: 0 | Refills: 0 | Status: DISCONTINUED | OUTPATIENT
Start: 2018-05-14 | End: 2018-05-23

## 2018-05-14 RX ADMIN — METHADONE HYDROCHLORIDE 90 MILLIGRAM(S): 40 TABLET ORAL at 05:32

## 2018-05-14 RX ADMIN — ESCITALOPRAM OXALATE 20 MILLIGRAM(S): 10 TABLET, FILM COATED ORAL at 08:18

## 2018-05-14 RX ADMIN — MIRTAZAPINE 15 MILLIGRAM(S): 45 TABLET, ORALLY DISINTEGRATING ORAL at 22:35

## 2018-05-14 RX ADMIN — Medication 100 MILLIGRAM(S): at 08:18

## 2018-05-14 NOTE — CHART NOTE - NSCHARTNOTEFT_GEN_A_CORE
Pt seen and assessed for insomnia- agrees to take remeron for sleep which was helpful in the past with no adverse effects.   Remeron 15 mg po qhs started

## 2018-05-15 RX ORDER — ASPIRIN/CALCIUM CARB/MAGNESIUM 324 MG
325 TABLET ORAL DAILY
Qty: 0 | Refills: 0 | Status: DISCONTINUED | OUTPATIENT
Start: 2018-05-15 | End: 2018-05-23

## 2018-05-15 RX ADMIN — MIRTAZAPINE 15 MILLIGRAM(S): 45 TABLET, ORALLY DISINTEGRATING ORAL at 22:41

## 2018-05-15 RX ADMIN — METHADONE HYDROCHLORIDE 90 MILLIGRAM(S): 40 TABLET ORAL at 05:32

## 2018-05-15 RX ADMIN — Medication 325 MILLIGRAM(S): at 14:21

## 2018-05-15 RX ADMIN — ESCITALOPRAM OXALATE 20 MILLIGRAM(S): 10 TABLET, FILM COATED ORAL at 08:30

## 2018-05-15 RX ADMIN — Medication 100 MILLIGRAM(S): at 21:28

## 2018-05-16 RX ADMIN — Medication 100 MILLIGRAM(S): at 21:33

## 2018-05-16 RX ADMIN — ESCITALOPRAM OXALATE 20 MILLIGRAM(S): 10 TABLET, FILM COATED ORAL at 08:30

## 2018-05-16 RX ADMIN — Medication 325 MILLIGRAM(S): at 08:30

## 2018-05-16 RX ADMIN — METHADONE HYDROCHLORIDE 90 MILLIGRAM(S): 40 TABLET ORAL at 05:22

## 2018-05-16 RX ADMIN — Medication 100 MILLIGRAM(S): at 08:30

## 2018-05-16 RX ADMIN — MIRTAZAPINE 15 MILLIGRAM(S): 45 TABLET, ORALLY DISINTEGRATING ORAL at 22:58

## 2018-05-17 DIAGNOSIS — F10.20 ALCOHOL DEPENDENCE, UNCOMPLICATED: ICD-10-CM

## 2018-05-17 DIAGNOSIS — F31.9 BIPOLAR DISORDER, UNSPECIFIED: ICD-10-CM

## 2018-05-17 DIAGNOSIS — F11.20 OPIOID DEPENDENCE, UNCOMPLICATED: ICD-10-CM
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
## 2018-05-17 DIAGNOSIS — F13.20 SEDATIVE, HYPNOTIC OR ANXIOLYTIC DEPENDENCE, UNCOMPLICATED: ICD-10-CM

## 2018-05-17 DIAGNOSIS — R45.851 SUICIDAL IDEATIONS: ICD-10-CM

## 2018-05-17 RX ADMIN — METHADONE HYDROCHLORIDE 90 MILLIGRAM(S): 40 TABLET ORAL at 05:58

## 2018-05-17 RX ADMIN — MIRTAZAPINE 15 MILLIGRAM(S): 45 TABLET, ORALLY DISINTEGRATING ORAL at 22:54

## 2018-05-17 RX ADMIN — Medication 100 MILLIGRAM(S): at 21:04

## 2018-05-17 RX ADMIN — Medication 100 MILLIGRAM(S): at 09:07

## 2018-05-17 RX ADMIN — ESCITALOPRAM OXALATE 20 MILLIGRAM(S): 10 TABLET, FILM COATED ORAL at 09:08

## 2018-05-17 RX ADMIN — Medication 325 MILLIGRAM(S): at 09:08

## 2018-05-18 RX ORDER — METHADONE HYDROCHLORIDE 40 MG/1
90 TABLET ORAL
Qty: 0 | Refills: 0 | Status: DISCONTINUED | OUTPATIENT
Start: 2018-05-18 | End: 2018-05-23

## 2018-05-18 RX ADMIN — MIRTAZAPINE 15 MILLIGRAM(S): 45 TABLET, ORALLY DISINTEGRATING ORAL at 23:10

## 2018-05-18 RX ADMIN — Medication 100 MILLIGRAM(S): at 23:10

## 2018-05-18 RX ADMIN — METHADONE HYDROCHLORIDE 90 MILLIGRAM(S): 40 TABLET ORAL at 05:22

## 2018-05-18 RX ADMIN — Medication 325 MILLIGRAM(S): at 08:43

## 2018-05-18 RX ADMIN — Medication 100 MILLIGRAM(S): at 08:43

## 2018-05-18 RX ADMIN — ESCITALOPRAM OXALATE 20 MILLIGRAM(S): 10 TABLET, FILM COATED ORAL at 08:43

## 2018-05-19 RX ADMIN — Medication 100 MILLIGRAM(S): at 08:37

## 2018-05-19 RX ADMIN — MIRTAZAPINE 15 MILLIGRAM(S): 45 TABLET, ORALLY DISINTEGRATING ORAL at 23:29

## 2018-05-19 RX ADMIN — Medication 325 MILLIGRAM(S): at 08:37

## 2018-05-19 RX ADMIN — METHADONE HYDROCHLORIDE 90 MILLIGRAM(S): 40 TABLET ORAL at 05:40

## 2018-05-19 RX ADMIN — ESCITALOPRAM OXALATE 20 MILLIGRAM(S): 10 TABLET, FILM COATED ORAL at 08:37

## 2018-05-19 RX ADMIN — Medication 100 MILLIGRAM(S): at 23:29

## 2018-05-20 RX ADMIN — Medication 325 MILLIGRAM(S): at 09:01

## 2018-05-20 RX ADMIN — Medication 100 MILLIGRAM(S): at 22:17

## 2018-05-20 RX ADMIN — MIRTAZAPINE 15 MILLIGRAM(S): 45 TABLET, ORALLY DISINTEGRATING ORAL at 22:17

## 2018-05-20 RX ADMIN — Medication 100 MILLIGRAM(S): at 09:01

## 2018-05-20 RX ADMIN — ESCITALOPRAM OXALATE 20 MILLIGRAM(S): 10 TABLET, FILM COATED ORAL at 09:01

## 2018-05-20 RX ADMIN — METHADONE HYDROCHLORIDE 90 MILLIGRAM(S): 40 TABLET ORAL at 05:47

## 2018-05-21 RX ADMIN — Medication 325 MILLIGRAM(S): at 08:46

## 2018-05-21 RX ADMIN — ESCITALOPRAM OXALATE 20 MILLIGRAM(S): 10 TABLET, FILM COATED ORAL at 08:46

## 2018-05-21 RX ADMIN — Medication 100 MILLIGRAM(S): at 08:46

## 2018-05-21 RX ADMIN — Medication 100 MILLIGRAM(S): at 21:13

## 2018-05-21 RX ADMIN — METHADONE HYDROCHLORIDE 90 MILLIGRAM(S): 40 TABLET ORAL at 05:21

## 2018-05-21 RX ADMIN — MIRTAZAPINE 15 MILLIGRAM(S): 45 TABLET, ORALLY DISINTEGRATING ORAL at 22:12

## 2018-05-22 VITALS
TEMPERATURE: 99 F | DIASTOLIC BLOOD PRESSURE: 64 MMHG | SYSTOLIC BLOOD PRESSURE: 123 MMHG | RESPIRATION RATE: 20 BRPM | HEART RATE: 89 BPM

## 2018-05-22 RX ADMIN — Medication 100 MILLIGRAM(S): at 10:12

## 2018-05-22 RX ADMIN — Medication 325 MILLIGRAM(S): at 10:12

## 2018-05-22 RX ADMIN — METHADONE HYDROCHLORIDE 90 MILLIGRAM(S): 40 TABLET ORAL at 05:06

## 2018-05-22 RX ADMIN — MIRTAZAPINE 15 MILLIGRAM(S): 45 TABLET, ORALLY DISINTEGRATING ORAL at 23:07

## 2018-05-22 RX ADMIN — ESCITALOPRAM OXALATE 20 MILLIGRAM(S): 10 TABLET, FILM COATED ORAL at 10:12

## 2018-05-22 RX ADMIN — Medication 100 MILLIGRAM(S): at 23:08

## 2018-05-23 RX ORDER — ASPIRIN/CALCIUM CARB/MAGNESIUM 324 MG
1 TABLET ORAL
Qty: 30 | Refills: 0
Start: 2018-05-23

## 2018-05-23 RX ORDER — METHADONE HYDROCHLORIDE 40 MG/1
80 TABLET ORAL
Qty: 0 | Refills: 0 | DISCHARGE
Start: 2018-05-23

## 2018-05-23 RX ORDER — ESCITALOPRAM OXALATE 10 MG/1
0 TABLET, FILM COATED ORAL
Qty: 0 | Refills: 0 | COMMUNITY

## 2018-05-23 RX ORDER — ESCITALOPRAM OXALATE 10 MG/1
1 TABLET, FILM COATED ORAL
Qty: 30 | Refills: 0
Start: 2018-05-23

## 2018-05-23 RX ORDER — MIRTAZAPINE 45 MG/1
1 TABLET, ORALLY DISINTEGRATING ORAL
Qty: 0 | Refills: 0 | DISCHARGE
Start: 2018-05-23

## 2018-05-23 RX ORDER — METHADONE HYDROCHLORIDE 40 MG/1
9 TABLET ORAL
Qty: 0 | Refills: 0 | DISCHARGE
Start: 2018-05-23

## 2018-05-23 RX ADMIN — METHADONE HYDROCHLORIDE 90 MILLIGRAM(S): 40 TABLET ORAL at 05:37

## 2018-05-23 RX ADMIN — Medication 100 MILLIGRAM(S): at 09:56

## 2018-05-23 RX ADMIN — Medication 325 MILLIGRAM(S): at 09:56

## 2018-05-23 RX ADMIN — ESCITALOPRAM OXALATE 20 MILLIGRAM(S): 10 TABLET, FILM COATED ORAL at 09:56

## 2018-05-23 NOTE — CHART NOTE - NSCHARTNOTEFT_GEN_A_CORE
The patient was admitted to the in  CDRU, for   ETOH_x___ Opioid___  Benzo____Polysubstance _____ Dependency.    Pt was admitted from ED____, Intake_x___, Med/surg Floor_______.    Details are present in the preceding History & Physical section and follow up chart notes.  patient was evaluated on daily detox team  rounds.  Withdrawal symptoms and signs were reviewed on as needed and the meds  were adjusted accordingly.    Labs and imaging results were reviewed and discussed with the patient.    All questions from the patient were addressed.  The patient was seen by the Chemical dependency counselors, and different options for after care were discussed.  The patient attended groups, meetings and 1:1 sessions with the counselors.  Narcane Kit was offered and instructions given prior to discharge.    Psychiatry consultation reviewed__x____, N/A______    Physical therapy evaluation reviewed_____, N/A__x__    Pt was given copies of labs and imaging reports, if applicable.    Prescriptions if needed, were sent through ER[facility] system to the pharmacy amnd are noted in the discharge instruction sheet.    After care was arranged by counselors and pt was discharged to:    Home_x__, Outpt. Program_x__, Rehab ___, Long term____ [facility] ____ IPP____ SNF____, AMA___, Admin Discharge____    Principal Diagnosis: Alcohol Dependency__x__ Opioid Dependency___ Benzo Dependency____ Polysubstance Dependency____

## 2018-05-23 NOTE — CHART NOTE - NSCHARTNOTEFT_GEN_A_CORE
Allergies:  penicillin (Hives)      Diet: Regular    Activity: as tolerated    Follow up with    1. PMD in 2 weeks    2. Psych in 2 weeks    3.    Follow up for abnormal labs/tests    1.    Extra Instructions:      Flu Vaccine given  Yes_____         No______      Diagnosis:  Chemical Dependency   Maintain sobriety  refrain from all use      Patient Signature___________________________________________  Date_________________      Nurse Signature_____________________________________________Date_________________

## 2018-05-25 DIAGNOSIS — F10.20 ALCOHOL DEPENDENCE, UNCOMPLICATED: ICD-10-CM

## 2018-05-25 DIAGNOSIS — F13.20 SEDATIVE, HYPNOTIC OR ANXIOLYTIC DEPENDENCE, UNCOMPLICATED: ICD-10-CM

## 2018-05-25 DIAGNOSIS — Z51.89 ENCOUNTER FOR OTHER SPECIFIED AFTERCARE: ICD-10-CM

## 2018-05-25 DIAGNOSIS — R45.851 SUICIDAL IDEATIONS: ICD-10-CM

## 2018-05-25 DIAGNOSIS — F31.9 BIPOLAR DISORDER, UNSPECIFIED: ICD-10-CM

## 2018-08-16 NOTE — ED BEHAVIORAL HEALTH ASSESSMENT NOTE - GAF
Problem: Patient Care Overview  Goal: Plan of Care/Patient Progress Review  Outcome: Improving  Patient doing well, having minimal complaints of incisional discomfort.  Patient avoiding oxycodone if able due to difficulty having bowel movement this morning.  Patient given sore nipple shells to use with lanolin cream.  Instructed to alternate lanolin with hydrogels due to cracked right nipple.  Encouraged to call for help with latch check as needed.  Will continue to monitor.        45

## 2019-09-14 NOTE — ED ADULT TRIAGE NOTE - LOCATION:
OB Provider reported that the vagina was inspected and no foreign body was found/Laps, needles and instrument count was correct Right arm;

## 2020-02-12 ENCOUNTER — EMERGENCY (EMERGENCY)
Facility: HOSPITAL | Age: 35
LOS: 0 days | Discharge: HOME | End: 2020-02-12
Attending: EMERGENCY MEDICINE
Payer: MEDICAID

## 2020-02-12 ENCOUNTER — INPATIENT (INPATIENT)
Facility: HOSPITAL | Age: 35
LOS: 0 days | Discharge: LEFT BEFORE DISCHARGE | End: 2020-02-13
Attending: INTERNAL MEDICINE | Admitting: INTERNAL MEDICINE

## 2020-02-12 VITALS
TEMPERATURE: 98 F | HEART RATE: 97 BPM | RESPIRATION RATE: 16 BRPM | WEIGHT: 175.05 LBS | SYSTOLIC BLOOD PRESSURE: 111 MMHG | HEIGHT: 65 IN | DIASTOLIC BLOOD PRESSURE: 62 MMHG

## 2020-02-12 VITALS
HEART RATE: 92 BPM | DIASTOLIC BLOOD PRESSURE: 76 MMHG | OXYGEN SATURATION: 97 % | RESPIRATION RATE: 18 BRPM | SYSTOLIC BLOOD PRESSURE: 126 MMHG | TEMPERATURE: 99 F

## 2020-02-12 VITALS
HEART RATE: 97 BPM | OXYGEN SATURATION: 99 % | SYSTOLIC BLOOD PRESSURE: 121 MMHG | HEIGHT: 64 IN | RESPIRATION RATE: 20 BRPM | DIASTOLIC BLOOD PRESSURE: 69 MMHG | WEIGHT: 175.05 LBS | TEMPERATURE: 99 F

## 2020-02-12 DIAGNOSIS — Z98.890 OTHER SPECIFIED POSTPROCEDURAL STATES: Chronic | ICD-10-CM

## 2020-02-12 DIAGNOSIS — F11.20 OPIOID DEPENDENCE, UNCOMPLICATED: ICD-10-CM

## 2020-02-12 DIAGNOSIS — F10.20 ALCOHOL DEPENDENCE, UNCOMPLICATED: ICD-10-CM

## 2020-02-12 DIAGNOSIS — I38 ENDOCARDITIS, VALVE UNSPECIFIED: ICD-10-CM

## 2020-02-12 DIAGNOSIS — F31.9 BIPOLAR DISORDER, UNSPECIFIED: ICD-10-CM

## 2020-02-12 DIAGNOSIS — B19.20 UNSPECIFIED VIRAL HEPATITIS C WITHOUT HEPATIC COMA: ICD-10-CM

## 2020-02-12 PROBLEM — F19.10 OTHER PSYCHOACTIVE SUBSTANCE ABUSE, UNCOMPLICATED: Chronic | Status: ACTIVE | Noted: 2018-04-10

## 2020-02-12 LAB
ALBUMIN SERPL ELPH-MCNC: 4.7 G/DL — SIGNIFICANT CHANGE UP (ref 3.5–5.2)
ALP SERPL-CCNC: 102 U/L — SIGNIFICANT CHANGE UP (ref 30–115)
ALT FLD-CCNC: 14 U/L — SIGNIFICANT CHANGE UP (ref 0–41)
AMMONIA BLD-MCNC: 22 UMOL/L — SIGNIFICANT CHANGE UP (ref 11–55)
AMYLASE P1 CFR SERPL: 45 U/L — SIGNIFICANT CHANGE UP (ref 25–115)
ANION GAP SERPL CALC-SCNC: 15 MMOL/L — HIGH (ref 7–14)
APPEARANCE UR: CLEAR — SIGNIFICANT CHANGE UP
APTT BLD: 25.5 SEC — LOW (ref 27–39.2)
AST SERPL-CCNC: 17 U/L — SIGNIFICANT CHANGE UP (ref 0–41)
BASOPHILS # BLD AUTO: 0.06 K/UL — SIGNIFICANT CHANGE UP (ref 0–0.2)
BASOPHILS NFR BLD AUTO: 0.7 % — SIGNIFICANT CHANGE UP (ref 0–1)
BILIRUB SERPL-MCNC: 0.3 MG/DL — SIGNIFICANT CHANGE UP (ref 0.2–1.2)
BILIRUB UR-MCNC: NEGATIVE — SIGNIFICANT CHANGE UP
BUN SERPL-MCNC: 19 MG/DL — SIGNIFICANT CHANGE UP (ref 10–20)
CALCIUM SERPL-MCNC: 9.3 MG/DL — SIGNIFICANT CHANGE UP (ref 8.5–10.1)
CHLORIDE SERPL-SCNC: 97 MMOL/L — LOW (ref 98–110)
CHOLEST SERPL-MCNC: 189 MG/DL — SIGNIFICANT CHANGE UP (ref 100–200)
CO2 SERPL-SCNC: 28 MMOL/L — SIGNIFICANT CHANGE UP (ref 17–32)
COLOR SPEC: YELLOW — SIGNIFICANT CHANGE UP
CREAT SERPL-MCNC: 1.4 MG/DL — SIGNIFICANT CHANGE UP (ref 0.7–1.5)
DIFF PNL FLD: NEGATIVE — SIGNIFICANT CHANGE UP
EOSINOPHIL # BLD AUTO: 0.12 K/UL — SIGNIFICANT CHANGE UP (ref 0–0.7)
EOSINOPHIL NFR BLD AUTO: 1.3 % — SIGNIFICANT CHANGE UP (ref 0–8)
ETHANOL SERPL-MCNC: <10 MG/DL — SIGNIFICANT CHANGE UP
GGT SERPL-CCNC: 13 U/L — SIGNIFICANT CHANGE UP (ref 1–40)
GLUCOSE SERPL-MCNC: 95 MG/DL — SIGNIFICANT CHANGE UP (ref 70–99)
GLUCOSE UR QL: NEGATIVE MG/DL — SIGNIFICANT CHANGE UP
HCT VFR BLD CALC: 40.4 % — LOW (ref 42–52)
HDLC SERPL-MCNC: 36 MG/DL — LOW
HGB BLD-MCNC: 13.9 G/DL — LOW (ref 14–18)
IMM GRANULOCYTES NFR BLD AUTO: 0.3 % — SIGNIFICANT CHANGE UP (ref 0.1–0.3)
INR BLD: 1.08 RATIO — SIGNIFICANT CHANGE UP (ref 0.65–1.3)
KETONES UR-MCNC: NEGATIVE — SIGNIFICANT CHANGE UP
LEUKOCYTE ESTERASE UR-ACNC: NEGATIVE — SIGNIFICANT CHANGE UP
LIPID PNL WITH DIRECT LDL SERPL: 123 MG/DL — SIGNIFICANT CHANGE UP (ref 4–129)
LYMPHOCYTES # BLD AUTO: 1.77 K/UL — SIGNIFICANT CHANGE UP (ref 1.2–3.4)
LYMPHOCYTES # BLD AUTO: 19.4 % — LOW (ref 20.5–51.1)
MAGNESIUM SERPL-MCNC: 2 MG/DL — SIGNIFICANT CHANGE UP (ref 1.8–2.4)
MCHC RBC-ENTMCNC: 29 PG — SIGNIFICANT CHANGE UP (ref 27–31)
MCHC RBC-ENTMCNC: 34.4 G/DL — SIGNIFICANT CHANGE UP (ref 32–37)
MCV RBC AUTO: 84.2 FL — SIGNIFICANT CHANGE UP (ref 80–94)
MONOCYTES # BLD AUTO: 0.85 K/UL — HIGH (ref 0.1–0.6)
MONOCYTES NFR BLD AUTO: 9.3 % — SIGNIFICANT CHANGE UP (ref 1.7–9.3)
NEUTROPHILS # BLD AUTO: 6.29 K/UL — SIGNIFICANT CHANGE UP (ref 1.4–6.5)
NEUTROPHILS NFR BLD AUTO: 69 % — SIGNIFICANT CHANGE UP (ref 42.2–75.2)
NITRITE UR-MCNC: NEGATIVE — SIGNIFICANT CHANGE UP
NRBC # BLD: 0 /100 WBCS — SIGNIFICANT CHANGE UP (ref 0–0)
PH UR: 6 — SIGNIFICANT CHANGE UP (ref 5–8)
PLATELET # BLD AUTO: 257 K/UL — SIGNIFICANT CHANGE UP (ref 130–400)
POTASSIUM SERPL-MCNC: 4.5 MMOL/L — SIGNIFICANT CHANGE UP (ref 3.5–5)
POTASSIUM SERPL-SCNC: 4.5 MMOL/L — SIGNIFICANT CHANGE UP (ref 3.5–5)
PROT SERPL-MCNC: 7.2 G/DL — SIGNIFICANT CHANGE UP (ref 6–8)
PROT UR-MCNC: NEGATIVE MG/DL — SIGNIFICANT CHANGE UP
PROTHROM AB SERPL-ACNC: 12.4 SEC — SIGNIFICANT CHANGE UP (ref 9.95–12.87)
RBC # BLD: 4.8 M/UL — SIGNIFICANT CHANGE UP (ref 4.7–6.1)
RBC # FLD: 12.1 % — SIGNIFICANT CHANGE UP (ref 11.5–14.5)
SODIUM SERPL-SCNC: 140 MMOL/L — SIGNIFICANT CHANGE UP (ref 135–146)
SP GR SPEC: >=1.03 (ref 1.01–1.03)
TOTAL CHOLESTEROL/HDL RATIO MEASUREMENT: 5.2 RATIO — SIGNIFICANT CHANGE UP (ref 4–5.5)
TRIGL SERPL-MCNC: 238 MG/DL — HIGH (ref 10–149)
UROBILINOGEN FLD QL: 0.2 MG/DL — SIGNIFICANT CHANGE UP (ref 0.2–0.2)
WBC # BLD: 9.12 K/UL — SIGNIFICANT CHANGE UP (ref 4.8–10.8)
WBC # FLD AUTO: 9.12 K/UL — SIGNIFICANT CHANGE UP (ref 4.8–10.8)

## 2020-02-12 PROCEDURE — 99283 EMERGENCY DEPT VISIT LOW MDM: CPT

## 2020-02-12 PROCEDURE — 99053 MED SERV 10PM-8AM 24 HR FAC: CPT

## 2020-02-12 RX ORDER — HYDROXYZINE HCL 10 MG
50 TABLET ORAL EVERY 6 HOURS
Refills: 0 | Status: DISCONTINUED | OUTPATIENT
Start: 2020-02-12 | End: 2020-02-13

## 2020-02-12 RX ORDER — ACETAMINOPHEN 500 MG
650 TABLET ORAL EVERY 4 HOURS
Refills: 0 | Status: DISCONTINUED | OUTPATIENT
Start: 2020-02-12 | End: 2020-02-13

## 2020-02-12 RX ORDER — METHADONE HYDROCHLORIDE 40 MG/1
80 TABLET ORAL ONCE
Refills: 0 | Status: DISCONTINUED | OUTPATIENT
Start: 2020-02-12 | End: 2020-02-12

## 2020-02-12 RX ORDER — HYDROXYZINE HCL 10 MG
100 TABLET ORAL AT BEDTIME
Refills: 0 | Status: DISCONTINUED | OUTPATIENT
Start: 2020-02-12 | End: 2020-02-13

## 2020-02-12 RX ORDER — PSEUDOEPHEDRINE HCL 30 MG
60 TABLET ORAL EVERY 6 HOURS
Refills: 0 | Status: DISCONTINUED | OUTPATIENT
Start: 2020-02-12 | End: 2020-02-13

## 2020-02-12 RX ORDER — CARIPRAZINE 1.5 MG/1
3 CAPSULE, GELATIN COATED ORAL DAILY
Refills: 0 | Status: DISCONTINUED | OUTPATIENT
Start: 2020-02-12 | End: 2020-02-12

## 2020-02-12 RX ORDER — MAGNESIUM HYDROXIDE 400 MG/1
30 TABLET, CHEWABLE ORAL ONCE
Refills: 0 | Status: DISCONTINUED | OUTPATIENT
Start: 2020-02-12 | End: 2020-02-13

## 2020-02-12 RX ORDER — GUAIFENESIN/DEXTROMETHORPHAN 600MG-30MG
5 TABLET, EXTENDED RELEASE 12 HR ORAL EVERY 4 HOURS
Refills: 0 | Status: DISCONTINUED | OUTPATIENT
Start: 2020-02-12 | End: 2020-02-13

## 2020-02-12 RX ORDER — PRAZOSIN HCL 2 MG
1 CAPSULE ORAL
Qty: 0 | Refills: 0 | DISCHARGE

## 2020-02-12 RX ORDER — DOXAZOSIN MESYLATE 4 MG
2 TABLET ORAL AT BEDTIME
Refills: 0 | Status: DISCONTINUED | OUTPATIENT
Start: 2020-02-12 | End: 2020-02-13

## 2020-02-12 RX ORDER — METHADONE HYDROCHLORIDE 40 MG/1
80 TABLET ORAL
Refills: 0 | Status: DISCONTINUED | OUTPATIENT
Start: 2020-02-13 | End: 2020-02-13

## 2020-02-12 RX ORDER — IBUPROFEN 200 MG
400 TABLET ORAL EVERY 6 HOURS
Refills: 0 | Status: DISCONTINUED | OUTPATIENT
Start: 2020-02-12 | End: 2020-02-13

## 2020-02-12 RX ORDER — FOLIC ACID 0.8 MG
1 TABLET ORAL DAILY
Refills: 0 | Status: DISCONTINUED | OUTPATIENT
Start: 2020-02-12 | End: 2020-02-13

## 2020-02-12 RX ORDER — CARIPRAZINE 1.5 MG/1
1 CAPSULE, GELATIN COATED ORAL
Qty: 0 | Refills: 0 | DISCHARGE

## 2020-02-12 RX ORDER — MULTIVIT-MIN/FERROUS GLUCONATE 9 MG/15 ML
1 LIQUID (ML) ORAL DAILY
Refills: 0 | Status: DISCONTINUED | OUTPATIENT
Start: 2020-02-12 | End: 2020-02-13

## 2020-02-12 RX ORDER — THIAMINE MONONITRATE (VIT B1) 100 MG
100 TABLET ORAL DAILY
Refills: 0 | Status: DISCONTINUED | OUTPATIENT
Start: 2020-02-12 | End: 2020-02-13

## 2020-02-12 RX ORDER — MIRTAZAPINE 45 MG/1
30 TABLET, ORALLY DISINTEGRATING ORAL AT BEDTIME
Refills: 0 | Status: DISCONTINUED | OUTPATIENT
Start: 2020-02-12 | End: 2020-02-13

## 2020-02-12 RX ORDER — METHOCARBAMOL 500 MG/1
500 TABLET, FILM COATED ORAL EVERY 6 HOURS
Refills: 0 | Status: DISCONTINUED | OUTPATIENT
Start: 2020-02-12 | End: 2020-02-13

## 2020-02-12 RX ORDER — ONDANSETRON 8 MG/1
4 TABLET, FILM COATED ORAL EVERY 6 HOURS
Refills: 0 | Status: DISCONTINUED | OUTPATIENT
Start: 2020-02-12 | End: 2020-02-13

## 2020-02-12 RX ORDER — CARIPRAZINE 1.5 MG/1
3 CAPSULE, GELATIN COATED ORAL DAILY
Refills: 0 | Status: DISCONTINUED | OUTPATIENT
Start: 2020-02-12 | End: 2020-02-13

## 2020-02-12 RX ADMIN — Medication 30 MILLILITER(S): at 14:31

## 2020-02-12 RX ADMIN — Medication 100 MILLIGRAM(S): at 17:35

## 2020-02-12 RX ADMIN — MIRTAZAPINE 30 MILLIGRAM(S): 45 TABLET, ORALLY DISINTEGRATING ORAL at 21:27

## 2020-02-12 RX ADMIN — Medication 25 MILLIGRAM(S): at 17:42

## 2020-02-12 RX ADMIN — Medication 1 TABLET(S): at 17:35

## 2020-02-12 RX ADMIN — Medication 1 MILLIGRAM(S): at 17:35

## 2020-02-12 RX ADMIN — Medication 2 MILLIGRAM(S): at 21:27

## 2020-02-12 RX ADMIN — Medication 25 MILLIGRAM(S): at 21:56

## 2020-02-12 RX ADMIN — METHADONE HYDROCHLORIDE 80 MILLIGRAM(S): 40 TABLET ORAL at 12:30

## 2020-02-12 RX ADMIN — Medication 50 MILLIGRAM(S): at 14:31

## 2020-02-12 NOTE — ED ADULT NURSE REASSESSMENT NOTE - NS ED NURSE REASSESS COMMENT FT1
pt a + o x 3, with brother. brother verbally aggressive and agitated with md gela and RN. pt and brother refusing to be discharged. pt offered to stay in designated area until able to follow-up in the morning. pd and security at bedside. pt given follow up info and information for shelters for the night. brother remains aggressive with staff. vital signs taken and vital signs all within normal limits. pt and brother removed from ed by security and pd.

## 2020-02-12 NOTE — H&P ADULT - NSHPPHYSICALEXAM_GEN_ALL_CORE
-  Vital Signs:      Temp: 98.0      Pulse:94         RR:  16      BP: 114/82     Physical Exam:              Constitutional: +Anxious, A&Ox3, W/N and W/D.  HEENT: NC/AT, PERRLA, EOM Intact, Nares normal, No Sinus tenderness.  Lips, mucosa and tongue normal; Neck supple, No adenopathy  Respiratory: CTAB, no rales, no rhonchi, no wheezes  Cardiovascular: +S1S2, No M/R/G  Gastrointestinal: +BS, soft, non-tender, not distended, No CVAT  Extremities: Atraumatic, no cyanosis, no edema, no calf tenderness,  Vascular: +dorsal pedis and radial pulses, no extremity cyanosis  Neurological: sensation intact, ROM equal B/L, CN II-XII intact, Gait: steady  Skin: no rashes, no lesions, normal turgor, No track marks,  No Decubiti present  No IV lines present  Rectal/Breasts Exam: Deferred

## 2020-02-12 NOTE — H&P ADULT - PROBLEM SELECTOR PLAN 5
c/w:  Home Medications:  mirtazapine 30 mg oral tablet: 1 tab(s) orally once a day (at bedtime) (12 Feb 2020 11:27)  prazosin 2 mg oral capsule: 1 cap(s) orally once a day (at bedtime) (12 Feb 2020 11:27)  Vraylar 3 mg oral capsule: 1 cap(s) orally once a day (12 Feb 2020 11:27)  (Non-formulary requested form filled up and faxed to pharmacy [id number], pharmacist Niya stevens)  observation  Atarax 50mg PO Q6H PRN for anxiety  Atarax 100mg PO QHS PRN for insomnia  psych consult PRN

## 2020-02-12 NOTE — ED PROVIDER NOTE - NSFOLLOWUPCLINICS_GEN_ALL_ED_FT
[facility]  Detox Mgmt  [address] [place] [address]  Phone: [phone]  Fax:   Follow Up Time: 1-3 Days

## 2020-02-12 NOTE — ED PROVIDER NOTE - CLINICAL SUMMARY MEDICAL DECISION MAKING FREE TEXT BOX
Pt  pw  family  requesting detox from etoh  No Si No Hi. No signs of trauma no active withdrawal.  No beds available at this time. dw patient and family outpt info given .

## 2020-02-12 NOTE — H&P ADULT - NSICDXPASTMEDICALHX_GEN_ALL_CORE_FT
PAST MEDICAL HISTORY:  Anxiety     Bipolar 1 disorder, depressed     Bipolar depression     Drug abuse     Drug abuse, IV     Endocarditis     Heart valve disease tricuspid valve surgery    Hepatitis C     Methadone maintenance therapy patient

## 2020-02-12 NOTE — H&P ADULT - PROBLEM SELECTOR PLAN 2
c/w: Methadone 80mg PO QD, first dose today (LDM: 2/11/20 verfied)  f/u with [facility] after dsicharge

## 2020-02-12 NOTE — ED PROVIDER NOTE - OBJECTIVE STATEMENT
Pt presents today for ETOH and drug detox. Pt history of prior detox. pt has no current complaints. Pt denies chest pain, shortness of breath, headache, dizziness, nausea/vomiting/diarrhea, head injury, recent trauma. Denies current thoughts of Suicidal and Homicidal Ideations.

## 2020-02-12 NOTE — ED PROVIDER NOTE - PATIENT PORTAL LINK FT
You can access the FollowMyHealth Patient Portal offered by [facility] by registering at the following website: [email or web address]. By joining [facility]’s FollowMyHealth portal, you will also be able to view your health information using other applications (apps) compatible with our system.

## 2020-02-12 NOTE — ED ADULT TRIAGE NOTE - CHIEF COMPLAINT QUOTE
pt requesting detox from alcohol and xanax, states last drink was 1 hour PTA states he drinks 2 liters vodka daily

## 2020-02-12 NOTE — H&P ADULT - ASSESSMENT
33 YO male with past hx of Hepatitis C+ treated in 2017, H/O endocarditis & tricuspid valve repair in 2012, H/O overdose, Bipolar I d/o, opioid dependence currently in [facility] and ETOH use disorder presents to Central Intake requesting for detox from ETOH.   Patient admits to drinking ETOH heavily daily for months.  Patient endorses feeling of anxiety, insomnia, mild lightheadedness, headache and tremors.  H/O withdrawal: N/V/D, Myalgia, Hot and cold intermittently and tremors.  Per patient, H/O ETOH withdrawal: +JORDI, +Tremor, +auditory hallucination/CAH (last mathur 5 months ago)  H/O overdose x 2, last OD in 2012  Bipolar I d/o

## 2020-02-12 NOTE — H&P ADULT - HISTORY OF PRESENT ILLNESS
no discrete location documentation necessary 33 YO male with past hx of Hepatitis C+ treated in 2017, H/O endocarditis & tricuspid valve repair in 2012, H/O overdose, Bipolar I d/o, opioid dependence currently in [facility] and ETOH use disorder presents to Central Intake requesting for detox from ETOH.   Patient admits to drinking ETOH heavily daily for months.  Patient endorses feeling of anxiety, insomnia, mild lightheadedness, headache and tremors.  H/O withdrawal: N/V/D, Myalgia, Hot and cold intermittently and tremors.  Per patient, H/O ETOH withdrawal: +JORDI, +Tremor, +auditory hallucination/CAH (last mathur 5 months ago)  H/O overdose x 2, last OD in 2012  Denies H/O seizure  Per patient, current home medication: Methadone, Remeron, Vraylar & Prazosin.   Patient A&Ox3, denies CP, SOB, headache, dizziness, bleeding and dysuria. Denies recent fall or injury  Patient admits to abusing current substances as follows:  DRUG	AGE OF ONSET	ROUTE	FREQ	AMOUNT	LAST USE	LENGTH OF CURRENT USE	  ETOH	19	PO	Daily	½ gallon of Vodka	2/11/20 1 liter	months	  Heroin	25				2 years ago		  Percocet (illicit) 		PO	1-2x /week	30 mg	1 week ago	1 year	  Methadone ([facility])		PO	Daily	80mg	2/11/20	1 year 	  							  I-Stop:       NEG  Last Inpatient Detox: 2016  Hx of Withdrawal Seizures: No   Psyhx: Anxiety, Bipolar I disorder, Per patient, H/O CAH 5 months ago voices telling him to hurt himself, denies H/O attempt to hurt himself. Denies current S/H Ideation or A/V Hallucination  Is patient currently receiving methadone from an [facility]:Yes, at [facility],  LDM: 2/11/20 verified by LPN from [facility]: LAM Hector  Tel: [phone]    Screening history	Last tested	Result	History of treatment	  HIV	2019	NEG	N/A	  Hepatitis C	2017	POS	Treated in 2017	  Quantiferon GOLD TB test	2019	NEG	N/A	    Immunization	Not Received	Unknown	Received	Date Received 	  Influenza			v	2017	  Pneumococcus		v			  Tetanus			v	2017	  Others

## 2020-02-12 NOTE — ED PROVIDER NOTE - NSFOLLOWUPINSTRUCTIONS_ED_ALL_ED_FT
Follow up with your detox in 1-2 days     Alcohol Use Disorder    WHAT YOU NEED TO KNOW:    Alcohol use disorder (AUD) is problem drinking. AUD includes alcohol abuse and alcohol dependence. Alcohol can damage your brain, heart, kidneys, lungs, and liver. Your risk of stroke is greater if you have 5 or more drinks each day. If you are pregnant, you and your baby are at risk for serious health problems. No amount of alcohol is safe during pregnancy.    DISCHARGE INSTRUCTIONS:    Call your local emergency number (911 in the [place]) if:     You have seizures.        Call your doctor if:     Your heart is beating faster than usual.      You have hallucinations.      You cannot remember what happens while you are drinking.      You are anxious and have nausea.      Your hands are shaky and you are sweating heavily.      You have questions or concerns about your condition or care.    Follow up with your healthcare provider as directed: Do not try to stop drinking on your own. Your healthcare provider may need to help you withdraw from alcohol safely. He or she may need to admit you to the hospital. You may also need any of the following:    Medicines to decrease your craving for alcohol      Support groups such as Alcoholics Anonymous       Therapy from a psychiatrist or psychologist       Admission to an inpatient facility for treatment for severe AUD    For support and more information:     Substance Abuse and Mental Health Services Administration  [address]  [place] [address]  Web Address: http://www.samhsa.gov      Alcoholics Anonymous  Web Address: http://www.aa.org           © Copyright [employer] 2019 All illustrations and images included in CareNotes are the copyrighted property of [employer] or [employer].

## 2020-02-12 NOTE — ED ADULT NURSE NOTE - NSIMPLEMENTINTERV_GEN_ALL_ED
Implemented All Universal Safety Interventions:  [place] to call system. Call bell, personal items and telephone within reach. Instruct patient to call for assistance. Room bathroom lighting operational. Non-slip footwear when patient is off stretcher. Physically safe environment: no spills, clutter or unnecessary equipment. Stretcher in lowest position, wheels locked, appropriate side rails in place.

## 2020-02-12 NOTE — H&P ADULT - PROBLEM SELECTOR PLAN 1
Check Urine Toxicology  [facility] librium Protocol  Thiamine 100mg PO daily  Folic Acid 1mg PO daily  MVI 1 tablet PO daily  Monitor VS and withdrawal symptoms  PRN Medications

## 2020-02-12 NOTE — ED PROVIDER NOTE - PROGRESS NOTE DETAILS
D/w patient and family there are no beds for detox and patient is not in withdrawl and that he must follow up in detox in the AM. patient family member became agigtated with staff and yelling at myself MD and nursing supervisor getting in providers faces cursing. Security called ot escort family member out. patient than became verbally abusive towards medical staff and stated "we are jokes"

## 2020-02-13 VITALS
SYSTOLIC BLOOD PRESSURE: 121 MMHG | DIASTOLIC BLOOD PRESSURE: 67 MMHG | TEMPERATURE: 96 F | RESPIRATION RATE: 16 BRPM | HEART RATE: 93 BPM

## 2020-02-13 DIAGNOSIS — Z02.9 ENCOUNTER FOR ADMINISTRATIVE EXAMINATIONS, UNSPECIFIED: ICD-10-CM

## 2020-02-13 LAB
AMPHET UR-MCNC: NEGATIVE — SIGNIFICANT CHANGE UP
BARBITURATES UR SCN-MCNC: NEGATIVE — SIGNIFICANT CHANGE UP
BENZODIAZ UR-MCNC: NEGATIVE — SIGNIFICANT CHANGE UP
COCAINE METAB.OTHER UR-MCNC: NEGATIVE — SIGNIFICANT CHANGE UP
DRUG SCREEN 1, URINE RESULT: SIGNIFICANT CHANGE UP
ESTIMATED AVERAGE GLUCOSE: 111 MG/DL — SIGNIFICANT CHANGE UP (ref 68–114)
HAV IGM SER-ACNC: SIGNIFICANT CHANGE UP
HBA1C BLD-MCNC: 5.5 % — SIGNIFICANT CHANGE UP (ref 4–5.6)
HBV CORE IGM SER-ACNC: SIGNIFICANT CHANGE UP
HBV SURFACE AG SER-ACNC: SIGNIFICANT CHANGE UP
HCV AB S/CO SERPL IA: 14.82 S/CO — HIGH (ref 0–0.99)
HCV AB SERPL-IMP: REACTIVE
HIV 1+2 AB+HIV1 P24 AG SERPL QL IA: SIGNIFICANT CHANGE UP
METHADONE UR-MCNC: POSITIVE
OPIATES UR-MCNC: NEGATIVE — SIGNIFICANT CHANGE UP
PCP UR-MCNC: NEGATIVE — SIGNIFICANT CHANGE UP
PROPOXYPHENE QUALITATIVE URINE RESULT: NEGATIVE — SIGNIFICANT CHANGE UP
T PALLIDUM AB TITR SER: NEGATIVE — SIGNIFICANT CHANGE UP
THC UR QL: NEGATIVE — SIGNIFICANT CHANGE UP

## 2020-02-14 LAB
GAMMA INTERFERON BACKGROUND BLD IA-ACNC: 0.01 IU/ML — SIGNIFICANT CHANGE UP
HCV RNA FLD QL NAA+PROBE: SIGNIFICANT CHANGE UP
HCV RNA SPEC QL PROBE+SIG AMP: SIGNIFICANT CHANGE UP
M TB IFN-G BLD-IMP: NEGATIVE — SIGNIFICANT CHANGE UP
M TB IFN-G CD4+ BCKGRND COR BLD-ACNC: 0 IU/ML — SIGNIFICANT CHANGE UP
M TB IFN-G CD4+CD8+ BCKGRND COR BLD-ACNC: 0 IU/ML — SIGNIFICANT CHANGE UP
QUANT TB PLUS MITOGEN MINUS NIL: 7.19 IU/ML — SIGNIFICANT CHANGE UP

## 2020-02-19 DIAGNOSIS — Z88.0 ALLERGY STATUS TO PENICILLIN: ICD-10-CM

## 2020-02-19 DIAGNOSIS — F10.20 ALCOHOL DEPENDENCE, UNCOMPLICATED: ICD-10-CM
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
## 2020-02-19 DIAGNOSIS — G47.00 INSOMNIA, UNSPECIFIED: ICD-10-CM

## 2020-02-19 DIAGNOSIS — F31.9 BIPOLAR DISORDER, UNSPECIFIED: ICD-10-CM

## 2020-02-19 DIAGNOSIS — F11.29 OPIOID DEPENDENCE WITH UNSPECIFIED OPIOID-INDUCED DISORDER: ICD-10-CM

## 2020-02-19 DIAGNOSIS — F41.9 ANXIETY DISORDER, UNSPECIFIED: ICD-10-CM

## 2024-03-07 NOTE — ED BEHAVIORAL HEALTH ASSESSMENT NOTE - ESTIMATED INTELLIGENCE
Left sided isthmus and left lower lobe nodule benign. Toxic goiter is suspected based on labs. Thyroid uptake is being ordered. Follow up thyroid imaging in 12-mo is recommended for surveillance, which I will assist with arranging.   
Thyroid gland anatomy and physiology reviewed. Pathophysiology of hyperthyroidism discussed, including differential diagnoses and management (ATD, RAMOS, and thyroidectomy). Suspect toxic goiter. Will refer for thyroid uptake scan. Discussed plan to initiate ATD methimazole, likely 5 mg daily dosing pending results. Will arrange follow up labs in 6-8 weeks to monitor response, and to assess tolerability to therapy. I will contact patient with results and recommendations.   
Average
